# Patient Record
Sex: FEMALE | Race: WHITE | NOT HISPANIC OR LATINO
[De-identification: names, ages, dates, MRNs, and addresses within clinical notes are randomized per-mention and may not be internally consistent; named-entity substitution may affect disease eponyms.]

---

## 2017-01-17 ENCOUNTER — RESULT REVIEW (OUTPATIENT)
Age: 37
End: 2017-01-17

## 2018-02-09 PROBLEM — Z00.00 ENCOUNTER FOR PREVENTIVE HEALTH EXAMINATION: Status: ACTIVE | Noted: 2018-02-09

## 2018-03-10 ENCOUNTER — OUTPATIENT (OUTPATIENT)
Dept: OUTPATIENT SERVICES | Facility: HOSPITAL | Age: 38
LOS: 1 days | End: 2018-03-10
Payer: COMMERCIAL

## 2018-03-10 ENCOUNTER — APPOINTMENT (OUTPATIENT)
Dept: MRI IMAGING | Facility: HOSPITAL | Age: 38
End: 2018-03-10
Payer: COMMERCIAL

## 2018-03-10 DIAGNOSIS — R94.7 ABNORMAL RESULTS OF OTHER ENDOCRINE FUNCTION STUDIES: ICD-10-CM

## 2018-03-10 PROCEDURE — 70553 MRI BRAIN STEM W/O & W/DYE: CPT

## 2018-03-11 PROCEDURE — 70553 MRI BRAIN STEM W/O & W/DYE: CPT | Mod: 26

## 2018-04-23 ENCOUNTER — RESULT REVIEW (OUTPATIENT)
Age: 38
End: 2018-04-23

## 2019-08-16 ENCOUNTER — NON-APPOINTMENT (OUTPATIENT)
Age: 39
End: 2019-08-16

## 2019-08-16 ENCOUNTER — APPOINTMENT (OUTPATIENT)
Dept: CARDIOLOGY | Facility: CLINIC | Age: 39
End: 2019-08-16
Payer: COMMERCIAL

## 2019-08-16 VITALS
OXYGEN SATURATION: 100 % | BODY MASS INDEX: 20.02 KG/M2 | RESPIRATION RATE: 15 BRPM | DIASTOLIC BLOOD PRESSURE: 70 MMHG | SYSTOLIC BLOOD PRESSURE: 107 MMHG | HEART RATE: 86 BPM | HEIGHT: 63 IN | WEIGHT: 113 LBS

## 2019-08-16 DIAGNOSIS — Z87.891 PERSONAL HISTORY OF NICOTINE DEPENDENCE: ICD-10-CM

## 2019-08-16 DIAGNOSIS — E28.8 OTHER OVARIAN DYSFUNCTION: ICD-10-CM

## 2019-08-16 DIAGNOSIS — Z78.9 OTHER SPECIFIED HEALTH STATUS: ICD-10-CM

## 2019-08-16 DIAGNOSIS — E78.00 PURE HYPERCHOLESTEROLEMIA, UNSPECIFIED: ICD-10-CM

## 2019-08-16 DIAGNOSIS — Z82.49 FAMILY HISTORY OF ISCHEMIC HEART DISEASE AND OTHER DISEASES OF THE CIRCULATORY SYSTEM: ICD-10-CM

## 2019-08-16 DIAGNOSIS — R00.2 PALPITATIONS: ICD-10-CM

## 2019-08-16 DIAGNOSIS — Z72.89 OTHER PROBLEMS RELATED TO LIFESTYLE: ICD-10-CM

## 2019-08-16 PROCEDURE — 36415 COLL VENOUS BLD VENIPUNCTURE: CPT

## 2019-08-16 PROCEDURE — 93000 ELECTROCARDIOGRAM COMPLETE: CPT

## 2019-08-16 PROCEDURE — 99204 OFFICE O/P NEW MOD 45 MIN: CPT

## 2019-08-16 RX ORDER — ESTRADIOL 0.04 MG/D
0.04 PATCH, EXTENDED RELEASE TRANSDERMAL
Qty: 8 | Refills: 0 | Status: ACTIVE | COMMUNITY
Start: 2019-08-16

## 2019-08-16 RX ORDER — PROGESTERONE 100 MG/1
100 CAPSULE ORAL
Qty: 30 | Refills: 0 | Status: ACTIVE | COMMUNITY
Start: 2019-08-16

## 2019-08-16 NOTE — DISCUSSION/SUMMARY
[FreeTextEntry1] : In summary  is a 39-year-old female with a history of some atypical palpitations and several risk factors for CAD.  Her exam shows normal blood pressure, clear lungs, and a normal cardiac exam.  An EKG shows incomplete right bundle branch block and is within normal limits.\par \par She does not know her cholesterol values so complete blood work was drawn.  If her level is elevated she should be started on a statin.  She has been able to stop smoking with the use of e-cigarettes and I encouraged her to continue.  I also discussed using a coronary artery calcium score for further risk stratification if her lipids come back borderline.

## 2019-08-16 NOTE — HISTORY OF PRESENT ILLNESS
[FreeTextEntry1] : 39-year-old female being seen as a new patients because of her concerns about possible CAD.  She is in good general health and has no history of heart disease and no exertional complaints.  She does notice a sensation of "flutters" which move around her chest and which occur at rest in a random pattern.\par \par She has several risk factors for CAD.  She has a family history of CAD on both sides of her family, her cholesterol is elevated although she does not know the results, she has a history of cigarette smoking and discontinued in February and is currently using e-cigarettes, and she has a history of premature ovarian insufficiency.\par \par

## 2019-08-16 NOTE — PHYSICAL EXAM
[General Appearance - Well Developed] : well developed [Normal Appearance] : normal appearance [Well Groomed] : well groomed [General Appearance - Well Nourished] : well nourished [No Deformities] : no deformities [General Appearance - In No Acute Distress] : no acute distress [Normal Conjunctiva] : the conjunctiva exhibited no abnormalities [Eyelids - No Xanthelasma] : the eyelids demonstrated no xanthelasmas [Normal Oral Mucosa] : normal oral mucosa [No Oral Pallor] : no oral pallor [No Oral Cyanosis] : no oral cyanosis [Normal Jugular Venous A Waves Present] : normal jugular venous A waves present [Normal Jugular Venous V Waves Present] : normal jugular venous V waves present [No Jugular Venous Winslow A Waves] : no jugular venous winslow A waves [Heart Rate And Rhythm] : heart rate and rhythm were normal [Heart Sounds] : normal S1 and S2 [Murmurs] : no murmurs present [Respiration, Rhythm And Depth] : normal respiratory rhythm and effort [Exaggerated Use Of Accessory Muscles For Inspiration] : no accessory muscle use [Auscultation Breath Sounds / Voice Sounds] : lungs were clear to auscultation bilaterally [Abdomen Soft] : soft [Abdomen Tenderness] : non-tender [Abdomen Mass (___ Cm)] : no abdominal mass palpated [Abnormal Walk] : normal gait [Gait - Sufficient For Exercise Testing] : the gait was sufficient for exercise testing [Nail Clubbing] : no clubbing of the fingernails [Cyanosis, Localized] : no localized cyanosis [Petechial Hemorrhages (___cm)] : no petechial hemorrhages [Skin Color & Pigmentation] : normal skin color and pigmentation [] : no rash [No Venous Stasis] : no venous stasis [Skin Lesions] : no skin lesions [No Skin Ulcers] : no skin ulcer [No Xanthoma] : no  xanthoma was observed [Oriented To Time, Place, And Person] : oriented to person, place, and time [Affect] : the affect was normal [Mood] : the mood was normal [No Anxiety] : not feeling anxious

## 2019-08-19 LAB
ALBUMIN SERPL ELPH-MCNC: 4.7 G/DL
ALP BLD-CCNC: 57 U/L
ALT SERPL-CCNC: 14 U/L
ANION GAP SERPL CALC-SCNC: 14 MMOL/L
AST SERPL-CCNC: 13 U/L
BASOPHILS # BLD AUTO: 0.05 K/UL
BASOPHILS NFR BLD AUTO: 0.9 %
BILIRUB SERPL-MCNC: 0.2 MG/DL
BUN SERPL-MCNC: 14 MG/DL
CALCIUM SERPL-MCNC: 9.6 MG/DL
CHLORIDE SERPL-SCNC: 101 MMOL/L
CHOLEST SERPL-MCNC: 183 MG/DL
CHOLEST/HDLC SERPL: 2.6 RATIO
CO2 SERPL-SCNC: 27 MMOL/L
CREAT SERPL-MCNC: 0.7 MG/DL
EOSINOPHIL # BLD AUTO: 0.12 K/UL
EOSINOPHIL NFR BLD AUTO: 2.1 %
ESTIMATED AVERAGE GLUCOSE: 105 MG/DL
GLUCOSE SERPL-MCNC: 71 MG/DL
HBA1C MFR BLD HPLC: 5.3 %
HCT VFR BLD CALC: 43 %
HDLC SERPL-MCNC: 71 MG/DL
HGB BLD-MCNC: 13.5 G/DL
IMM GRANULOCYTES NFR BLD AUTO: 0.2 %
LDLC SERPL CALC-MCNC: 86 MG/DL
LYMPHOCYTES # BLD AUTO: 1.81 K/UL
LYMPHOCYTES NFR BLD AUTO: 31.6 %
MAN DIFF?: NORMAL
MCHC RBC-ENTMCNC: 29.2 PG
MCHC RBC-ENTMCNC: 31.4 GM/DL
MCV RBC AUTO: 92.9 FL
MONOCYTES # BLD AUTO: 0.43 K/UL
MONOCYTES NFR BLD AUTO: 7.5 %
NEUTROPHILS # BLD AUTO: 3.3 K/UL
NEUTROPHILS NFR BLD AUTO: 57.7 %
PLATELET # BLD AUTO: 260 K/UL
POTASSIUM SERPL-SCNC: 4.3 MMOL/L
PROT SERPL-MCNC: 7.4 G/DL
RBC # BLD: 4.63 M/UL
RBC # FLD: 12.6 %
SODIUM SERPL-SCNC: 142 MMOL/L
TRIGL SERPL-MCNC: 128 MG/DL
TSH SERPL-ACNC: 2.05 UIU/ML
WBC # FLD AUTO: 5.72 K/UL

## 2020-06-24 ENCOUNTER — RESULT REVIEW (OUTPATIENT)
Age: 40
End: 2020-06-24

## 2021-09-26 ENCOUNTER — INPATIENT (INPATIENT)
Facility: HOSPITAL | Age: 41
LOS: 0 days | Discharge: LEFT BEFORE DISCHARGE | End: 2021-09-27
Attending: HOSPITALIST | Admitting: HOSPITALIST
Payer: COMMERCIAL

## 2021-09-26 VITALS
DIASTOLIC BLOOD PRESSURE: 70 MMHG | SYSTOLIC BLOOD PRESSURE: 100 MMHG | RESPIRATION RATE: 16 BRPM | TEMPERATURE: 98 F | HEART RATE: 78 BPM | OXYGEN SATURATION: 100 %

## 2021-09-26 VITALS
TEMPERATURE: 98 F | RESPIRATION RATE: 16 BRPM | DIASTOLIC BLOOD PRESSURE: 73 MMHG | OXYGEN SATURATION: 100 % | HEART RATE: 100 BPM | SYSTOLIC BLOOD PRESSURE: 112 MMHG

## 2021-09-26 DIAGNOSIS — L03.213 PERIORBITAL CELLULITIS: ICD-10-CM

## 2021-09-26 LAB
ALBUMIN SERPL ELPH-MCNC: 4.7 G/DL — SIGNIFICANT CHANGE UP (ref 3.3–5)
ALP SERPL-CCNC: 57 U/L — SIGNIFICANT CHANGE UP (ref 40–120)
ALT FLD-CCNC: 14 U/L — SIGNIFICANT CHANGE UP (ref 4–33)
ANION GAP SERPL CALC-SCNC: 14 MMOL/L — SIGNIFICANT CHANGE UP (ref 7–14)
AST SERPL-CCNC: 16 U/L — SIGNIFICANT CHANGE UP (ref 4–32)
BASOPHILS # BLD AUTO: 0.05 K/UL — SIGNIFICANT CHANGE UP (ref 0–0.2)
BASOPHILS NFR BLD AUTO: 0.6 % — SIGNIFICANT CHANGE UP (ref 0–2)
BILIRUB SERPL-MCNC: 0.4 MG/DL — SIGNIFICANT CHANGE UP (ref 0.2–1.2)
BUN SERPL-MCNC: 12 MG/DL — SIGNIFICANT CHANGE UP (ref 7–23)
CALCIUM SERPL-MCNC: 9.3 MG/DL — SIGNIFICANT CHANGE UP (ref 8.4–10.5)
CHLORIDE SERPL-SCNC: 102 MMOL/L — SIGNIFICANT CHANGE UP (ref 98–107)
CO2 SERPL-SCNC: 23 MMOL/L — SIGNIFICANT CHANGE UP (ref 22–31)
CREAT SERPL-MCNC: 0.66 MG/DL — SIGNIFICANT CHANGE UP (ref 0.5–1.3)
EOSINOPHIL # BLD AUTO: 0.1 K/UL — SIGNIFICANT CHANGE UP (ref 0–0.5)
EOSINOPHIL NFR BLD AUTO: 1.1 % — SIGNIFICANT CHANGE UP (ref 0–6)
GLUCOSE SERPL-MCNC: 97 MG/DL — SIGNIFICANT CHANGE UP (ref 70–99)
HCG SERPL-ACNC: <5 MIU/ML — SIGNIFICANT CHANGE UP
HCT VFR BLD CALC: 40.3 % — SIGNIFICANT CHANGE UP (ref 34.5–45)
HGB BLD-MCNC: 13.6 G/DL — SIGNIFICANT CHANGE UP (ref 11.5–15.5)
IANC: 6.7 K/UL — SIGNIFICANT CHANGE UP (ref 1.5–8.5)
IMM GRANULOCYTES NFR BLD AUTO: 0.1 % — SIGNIFICANT CHANGE UP (ref 0–1.5)
LYMPHOCYTES # BLD AUTO: 1.54 K/UL — SIGNIFICANT CHANGE UP (ref 1–3.3)
LYMPHOCYTES # BLD AUTO: 17.3 % — SIGNIFICANT CHANGE UP (ref 13–44)
MCHC RBC-ENTMCNC: 29.8 PG — SIGNIFICANT CHANGE UP (ref 27–34)
MCHC RBC-ENTMCNC: 33.7 GM/DL — SIGNIFICANT CHANGE UP (ref 32–36)
MCV RBC AUTO: 88.2 FL — SIGNIFICANT CHANGE UP (ref 80–100)
MONOCYTES # BLD AUTO: 0.48 K/UL — SIGNIFICANT CHANGE UP (ref 0–0.9)
MONOCYTES NFR BLD AUTO: 5.4 % — SIGNIFICANT CHANGE UP (ref 2–14)
NEUTROPHILS # BLD AUTO: 6.7 K/UL — SIGNIFICANT CHANGE UP (ref 1.8–7.4)
NEUTROPHILS NFR BLD AUTO: 75.5 % — SIGNIFICANT CHANGE UP (ref 43–77)
NRBC # BLD: 0 /100 WBCS — SIGNIFICANT CHANGE UP
NRBC # FLD: 0 K/UL — SIGNIFICANT CHANGE UP
PLATELET # BLD AUTO: 267 K/UL — SIGNIFICANT CHANGE UP (ref 150–400)
POTASSIUM SERPL-MCNC: 3.8 MMOL/L — SIGNIFICANT CHANGE UP (ref 3.5–5.3)
POTASSIUM SERPL-SCNC: 3.8 MMOL/L — SIGNIFICANT CHANGE UP (ref 3.5–5.3)
PROT SERPL-MCNC: 7.4 G/DL — SIGNIFICANT CHANGE UP (ref 6–8.3)
RBC # BLD: 4.57 M/UL — SIGNIFICANT CHANGE UP (ref 3.8–5.2)
RBC # FLD: 11.8 % — SIGNIFICANT CHANGE UP (ref 10.3–14.5)
SODIUM SERPL-SCNC: 139 MMOL/L — SIGNIFICANT CHANGE UP (ref 135–145)
WBC # BLD: 8.88 K/UL — SIGNIFICANT CHANGE UP (ref 3.8–10.5)
WBC # FLD AUTO: 8.88 K/UL — SIGNIFICANT CHANGE UP (ref 3.8–10.5)

## 2021-09-26 PROCEDURE — 99285 EMERGENCY DEPT VISIT HI MDM: CPT

## 2021-09-26 PROCEDURE — 70481 CT ORBIT/EAR/FOSSA W/DYE: CPT | Mod: 26

## 2021-09-26 RX ORDER — OXYCODONE AND ACETAMINOPHEN 5; 325 MG/1; MG/1
1 TABLET ORAL ONCE
Refills: 0 | Status: DISCONTINUED | OUTPATIENT
Start: 2021-09-26 | End: 2021-09-26

## 2021-09-26 RX ORDER — ACETAMINOPHEN 500 MG
650 TABLET ORAL EVERY 6 HOURS
Refills: 0 | Status: DISCONTINUED | OUTPATIENT
Start: 2021-09-26 | End: 2021-09-27

## 2021-09-26 RX ORDER — SODIUM CHLORIDE 9 MG/ML
1000 INJECTION INTRAMUSCULAR; INTRAVENOUS; SUBCUTANEOUS ONCE
Refills: 0 | Status: COMPLETED | OUTPATIENT
Start: 2021-09-26 | End: 2021-09-26

## 2021-09-26 RX ORDER — DIPHENHYDRAMINE HCL 50 MG
50 CAPSULE ORAL ONCE
Refills: 0 | Status: COMPLETED | OUTPATIENT
Start: 2021-09-26 | End: 2021-09-26

## 2021-09-26 RX ORDER — ACETAMINOPHEN 500 MG
975 TABLET ORAL ONCE
Refills: 0 | Status: COMPLETED | OUTPATIENT
Start: 2021-09-26 | End: 2021-09-26

## 2021-09-26 RX ORDER — ENOXAPARIN SODIUM 100 MG/ML
40 INJECTION SUBCUTANEOUS DAILY
Refills: 0 | Status: DISCONTINUED | OUTPATIENT
Start: 2021-09-26 | End: 2021-09-27

## 2021-09-26 RX ORDER — PIPERACILLIN AND TAZOBACTAM 4; .5 G/20ML; G/20ML
3.38 INJECTION, POWDER, LYOPHILIZED, FOR SOLUTION INTRAVENOUS ONCE
Refills: 0 | Status: COMPLETED | OUTPATIENT
Start: 2021-09-26 | End: 2021-09-26

## 2021-09-26 RX ORDER — VANCOMYCIN HCL 1 G
1000 VIAL (EA) INTRAVENOUS ONCE
Refills: 0 | Status: COMPLETED | OUTPATIENT
Start: 2021-09-26 | End: 2021-09-26

## 2021-09-26 RX ADMIN — Medication 50 MILLIGRAM(S): at 21:08

## 2021-09-26 RX ADMIN — OXYCODONE AND ACETAMINOPHEN 1 TABLET(S): 5; 325 TABLET ORAL at 17:24

## 2021-09-26 RX ADMIN — Medication 975 MILLIGRAM(S): at 09:55

## 2021-09-26 RX ADMIN — PIPERACILLIN AND TAZOBACTAM 200 GRAM(S): 4; .5 INJECTION, POWDER, LYOPHILIZED, FOR SOLUTION INTRAVENOUS at 16:46

## 2021-09-26 RX ADMIN — Medication 250 MILLIGRAM(S): at 17:25

## 2021-09-26 RX ADMIN — Medication 100 MILLIGRAM(S): at 09:55

## 2021-09-26 RX ADMIN — Medication 50 MILLIGRAM(S): at 13:53

## 2021-09-26 RX ADMIN — SODIUM CHLORIDE 1000 MILLILITER(S): 9 INJECTION INTRAMUSCULAR; INTRAVENOUS; SUBCUTANEOUS at 09:55

## 2021-09-26 NOTE — ED PROVIDER NOTE - PROGRESS NOTE DETAILS
DD ED ATTG: L eye swelling decreased.  however pt developed hives to arms.  clinda and CT with contrast were done hours before.  Pt did eat recently.  No sign of throat swelling, lips/tongue swelling, voice change.  Allergic reaction.  Pt thinks it may be due to the gown.  Given new gown.  Pt recently ate lunch - possibly allergic reaction to food - will rx benadryl.  Pt has been seen by ophtho - concern for lacrimal gland cyst vs abscess.  Ophtho exam ongoing, likely admission for IV abx and consideration of OR.  KRANTHI Bailey advised pt about this plan.

## 2021-09-26 NOTE — ED ADULT TRIAGE NOTE - CHIEF COMPLAINT QUOTE
Pt c/o swelling and pain to Lt eye starting yesterday, pt having difficulty seeing out of Lt eye. Pt got 1st dose of covid vaccine on 9/24/2021. Currently afebrile, no known allergies.

## 2021-09-26 NOTE — ED ADULT NURSE NOTE - OBJECTIVE STATEMENT
Pt rec'd in intake, c/o pain and swelling to left eye since yesterday, denies discharge, denies fever. Pt denies visual changes, denies sick contacts.

## 2021-09-26 NOTE — ED PROVIDER NOTE - CLINICAL SUMMARY MEDICAL DECISION MAKING FREE TEXT BOX
41F, no sig PMH/POH, presents with OS periorbital swelling, erythema and pain for a day. Yesterday pt noticed swelling and pain in her JOAN on the lateral aspect. The pain increased gradually and was associated with significant tearing and photophobia. She denies any fever or systemic symptoms including URTI symptoms. Denies any visual changes, flashes, floaters, curtains or discharge from the eye.  plan  - labs  - ct orbits  - ophthalmology consult  - iv abx  - reassess

## 2021-09-26 NOTE — CHART NOTE - NSCHARTNOTEFT_GEN_A_CORE
pt with red rash over body after receiving abx.  she said happened 1st time as well, received benedryl then.  giving another dose and informed Dr. Abraham of inpatient team that pt needs exam, possible allergy vs heide.  pt breathing well

## 2021-09-26 NOTE — ED PROVIDER NOTE - ATTENDING CONTRIBUTION TO CARE
41F p/w L eye swelling, redness and pain x 1 day, gradually increasing.  No fever.  Some pain with moving eye around, (+)photosensitivity.  No injury.  Never happened before.  L eye swollen shut but able to see when eye opened manually.  Pain and restriction of movement of L eye upon lateral gaze either way.  No purulence.  Plan CT orbits, ophtho eval, rx'ed pain meds and Iv abx.  VS:  unremarkable except tachycardia     GEN - mild distress L eye pain, photophobia, malaise;   A+O x3   HEAD - NC/AT     ENT - PEERL, mucous membranes    moist , no discharge    L eye swollen shut but able to see when eye opened manually.  Pain and restriction of movement of L eye upon lateral gaze either way.  No purulence. Corneas clear.     NECK: Neck supple, non-tender without lymphadenopathy, no masses, no JVD  PULM - CTA b/l,  symmetric breath sounds  COR -  normal heart sounds    ABD - , ND, NT, soft,  BACK - no CVA tenderness, nontender spine     EXTREMS - no edema, no deformity, warm and well perfused    SKIN - no rash    or bruising      NEUROLOGIC - alert, face symmetric, speech fluent, sensation nl, motor no focal deficit.

## 2021-09-26 NOTE — CONSULT NOTE ADULT - ASSESSMENT
PRELIMINARY NOTE  41F, no PMH, POH of lacrimal cyst excision OD, presented with slowly growing JOAN swelling that became red and painful for 1 day. On exam, VA 20/20 OU, PERRL no APD, IOP wnl OU. EOM with pain in far right and left gaze but no limitation or diplopia, confrontational VF full and color vision intact OU. OS with JOAN swelling erythema and tenderness, no mass noted. No LAD. JOAN flipped without chalazion. OS conjunctiva with mild injection, corneas clear OU, AC deep and quiet OU. Fundus exam unremarkable OU. CT orbits showing multiloculated left orbital abscess involving the preseptal and postseptal space.    # Lisa lacrimal gland abscess OS  -Pt with history of lacrimal gland cyst, s/p excision in [place] 20 years ago.   -Presented with slowly growing mass with acute inflammatory change in the past day.  -JOAN swelling, erythema and tenderness, no chalazion or drainage, no visible of lid flip.  -CT orbits positive for multiloculated abscess involving orbital space  -S/p IV clindamycin 600 mg  -Keep NPO  -Per literature review most common pathogens are Staph sp. Strep pneumo, Kleb pneumonia also reported.     PRELIMINARY NOTE  41F, no PMH, POH of lacrimal cyst excision OD, presented with slowly growing JOAN swelling that became red and painful for 1 day. On exam, VA 20/20 OU, PERRL no APD, IOP wnl OU. EOM with pain in far right and left gaze but no limitation or diplopia, confrontational VF full and color vision intact OU. OS with JOAN swelling erythema and tenderness, no mass noted. No LAD. JOAN flipped without chalazion. OS conjunctiva with mild injection, corneas clear OU, AC deep and quiet OU. Fundus exam unremarkable OU. CT orbits showing multiloculated left orbital abscess involving the preseptal and postseptal space.    # Lisa lacrimal gland abscess OS  -Pt with history of lacrimal gland cyst, s/p excision in OD 20 years ago.   -Presented with slowly growing mass with acute inflammatory change in the past day.  -JAON swelling, erythema and tenderness, no chalazion or drainage, no visible of lid flip.  - EOM ess full, no proptosis, no RTR  -CT orbits positive for multiloculated abscess involving orbital space  -S/p IV clindamycin 600 mg  -Keep NPO for possible drainage tomorrow  - please document medical clearance for possible drainage  - COVID test please  -s/p reaction?to vanco. Start clinda and ceftriaxone for coverage.   - Warm compresses 4x a day  - Dr Salgado will evaluate in the AM and decide on surgery   -Per literature review most common pathogens are Staph sp. Strep pneumo, Kleb pneumonia also reported.       SDW Dr Arce & D/w Dr Salgado with videos and photos   41F, no PMH, POH of lacrimal cyst excision OD, presented with slowly growing JOAN swelling that became red and painful for 1 day. On exam, VA 20/20 OU, PERRL no APD, IOP wnl OU. EOM with pain in far right and left gaze but no limitation or diplopia, confrontational VF full and color vision intact OU. OS with JOAN swelling erythema and tenderness, no mass noted. No LAD. JOAN flipped without chalazion. OS conjunctiva with mild injection, corneas clear OU, AC deep and quiet OU. Fundus exam unremarkable OU. CT orbits showing multiloculated left orbital abscess involving the preseptal and postseptal space.    # Lisa lacrimal gland abscess OS  -Pt with history of lacrimal gland cyst, s/p excision in OD 20 years ago.   -Presented with slowly growing mass with acute inflammatory change in the past day.  -JOAN swelling, erythema and tenderness, no chalazion or drainage, no visible of lid flip.  - EOM ess full, no proptosis, no RTR  -CT orbits positive for multiloculated abscess involving orbital space  -S/p IV clindamycin 600 mg, IV vanco and zosyn  - Start IV clindamycin and IV ceftriaxone starting tomorrow  - Keep NPO from midnight for possible drainage tomorrow  - please document medical clearance for possible drainage  - COVID test please  -s/p reaction?to vanco. Start clinda and ceftriaxone for coverage.   - Warm compresses 4x a day  - Dr Salgado will evaluate in the AM and decide on surgery   -Per literature review most common pathogens are Staph sp. Strep pneumo, Kleb pneumonia also reported.     SDW Dr Arce & D/w Dr Salgado with videos and photos

## 2021-09-26 NOTE — ED PROVIDER NOTE - OBJECTIVE STATEMENT
41F, no sig PMH/POH, presents with OS periorbital swelling, erythema and pain for a day. Yesterday pt noticed swelling and pain in her JOAN on the lateral aspect. The pain increased gradually and was associated with significant tearing and photophobia. She denies any fever or systemic symptoms including URTI symptoms. Denies any visual changes, flashes, floaters, curtains or discharge from the eye.

## 2021-09-26 NOTE — ED ADULT NURSE NOTE - NS ED NURSE ELOPE COMMENTS
[id number]. mar at bedside explaining plan of care of optho consult. patient in street clothes. reports she did not want to stay because shes been here for hours. ripped out iv. provider gave band aid. patient ambulated with steady gait out of er.

## 2021-09-26 NOTE — CONSULT NOTE ADULT - SUBJECTIVE AND OBJECTIVE BOX
[facility] DEPARTMENT OF OPHTHALMOLOGY - INITIAL ADULT CONSULT  ----------------------------------------------------------------------------------------------------  Jorden Ayo, PGY-2  [phone], available on teams  ----------------------------------------------------------------------------------------------------    HPI:  41F, no sig PMH/POH, presents with OS periorbital swelling, erythema and pain for a day. Yesterday pt noticed swelling and pain in her JOAN on the lateral aspect. The pain increased gradually and was associated with significant tearing and photophobia. She denies any fever or systemic symptoms including URTI symptoms. Denies any visual changes, flashes, floaters, curtains or discharge from the eye. On further ROS pt remembered that 20 years ago she had a cyst in the upper eye lid that was drained by an ophthalmologist as an elective procedure under local anesthesia.   Recently received the first dose of the Pfizer vaccine.     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    MEDICATIONS  (STANDING):  MEDICATIONS  (PRN):    Allergies & Intolerances: none to meds    Review of Systems:  Constitutional: No fever, chills  Eyes: No blurry vision, flashes, floaters, FBS, ++erythema OS, no discharge, double vision, OU  Neuro: No tremors  Cardiovascular: No chest pain, palpitations  Respiratory: No SOB, no cough  GI: No nausea, vomiting, abdominal pain  : No dysuria  Skin: no rash  Psych: no depression  Endocrine: no polyuria, polydipsia  Heme/lymph: no swelling    VITALS: T(C): 36.7 (09-26-21 @ 08:36)  T(F): 98 (09-26-21 @ 08:36), Max: 98 (09-26-21 @ 08:36)  HR: 100 (09-26-21 @ 08:36) (100 - 100)  BP: 112/73 (09-26-21 @ 08:36) (112/73 - 112/73)  RR:  (16 - 16)  SpO2:  (100% - 100%)  General: AAO x 3, appropriate mood and affect    Ophthalmology Exam:  Visual acuity (sc): 20/20 OU  Pupils: PERRL OU, no APD  Ttono: 13/14  Extraocular movements (EOMs): Full OU, no pain, no diplopia  Confrontational Visual Field (CVF): Full OU  Color Plates: 12/12 OU    Pen Light Exam (PLE)  External: Flat OD, OS with JOAN swelling erythema and tenderness, no mass noted. No LAD.   Lids/Lashes/Lacrimal Ducts: Flat OD, OS as above, lid flipped without chalazion  Sclera/Conjunctiva: W+Q OD, OS +1 injection  Cornea: Cl OU  Anterior Chamber: D+F OU    Iris: Flat OU  Lens: Cl OU    Fundus Exam: dilated with 1% tropicamide and 2.5% phenylephrine  Approval obtained from primary team for dilation  Patient aware that pupils can remained dilated for at least 4-6 hours  Exam performed with 20D lens    Vitreous: wnl OU  Disc, cup/disc: sharp and pink, 0.4 OU  Macula: wnl OU  Vessels: wnl OU  Periphery: wnl OU    Labs/Imaging:       [facility] DEPARTMENT OF OPHTHALMOLOGY - INITIAL ADULT CONSULT  ----------------------------------------------------------------------------------------------------  Jorden Coffeybayandel, PGY-2  [phone], available on teams  ----------------------------------------------------------------------------------------------------    HPI:  41F, no sig PMH/POH, presents with OS periorbital swelling, erythema and pain for a day. Yesterday pt noticed swelling and pain in her JOAN on the lateral aspect. The pain increased gradually and was associated with significant tearing and photophobia. She denies any fever or systemic symptoms including URTI symptoms. Denies any visual changes, flashes, floaters, curtains or discharge from the eye. On further ROS pt remembered that 20 years ago she had a cyst in the upper eye lid that was drained by an ophthalmologist as an elective procedure under local anesthesia.   On further questioning, pt admits to having a small swelling in that area in the past month that was painless and not red, she tried to reach the ophthalmologist that operated on her but couldn't.   Recently received the first dose of the Pfizer vaccine.     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    MEDICATIONS  (STANDING):  MEDICATIONS  (PRN):    Allergies & Intolerances: none to meds    Review of Systems:  Constitutional: No fever, chills  Eyes: No blurry vision, flashes, floaters, FBS, ++erythema OS, no discharge, double vision, OU  Neuro: No tremors  Cardiovascular: No chest pain, palpitations  Respiratory: No SOB, no cough  GI: No nausea, vomiting, abdominal pain  : No dysuria  Skin: no rash  Psych: no depression  Endocrine: no polyuria, polydipsia  Heme/lymph: no swelling    VITALS: T(C): 36.7 (09-26-21 @ 08:36)  T(F): 98 (09-26-21 @ 08:36), Max: 98 (09-26-21 @ 08:36)  HR: 100 (09-26-21 @ 08:36) (100 - 100)  BP: 112/73 (09-26-21 @ 08:36) (112/73 - 112/73)  RR:  (16 - 16)  SpO2:  (100% - 100%)  General: AAO x 3, appropriate mood and affect    Ophthalmology Exam:  Visual acuity (sc): 20/20 OU  Pupils: PERRL OU, no APD  Ttono: 13/14  Extraocular movements (EOMs): Full OU, pain on left and right far gaze, no diplopia  Confrontational Visual Field (CVF): Full OU  Color Plates: 12/12 OU    Pen Light Exam (PLE)  External: Flat OD, OS with JOAN swelling erythema and tenderness, no mass noted. No LAD.   Lids/Lashes/Lacrimal Ducts: Flat OD, OS as above, lid flipped without chalazion  Sclera/Conjunctiva: W+Q OD, OS +1 injection  Cornea: Cl OU  Anterior Chamber: D+F OU    Iris: Flat OU  Lens: Cl OU    Fundus Exam: dilated with 1% tropicamide and 2.5% phenylephrine  Approval obtained from primary team for dilation  Patient aware that pupils can remained dilated for at least 4-6 hours  Exam performed with 20D lens    Vitreous: wnl OU  Disc, cup/disc: sharp and pink, 0.4 OU  Macula: wnl OU  Vessels: wnl OU  Periphery: wnl OU    Labs/Imaging:       [facility] DEPARTMENT OF OPHTHALMOLOGY - INITIAL ADULT CONSULT  ----------------------------------------------------------------------------------------------------  Jorden Coffeybayandel, PGY-2  [phone], available on teams  ----------------------------------------------------------------------------------------------------    HPI:  41F, no sig PMH/POH, presents with OS periorbital swelling, erythema and pain for a day. Yesterday pt noticed swelling and pain in her JOAN on the lateral aspect. The pain increased gradually and was associated with significant tearing and photophobia. She denies any fever or systemic symptoms including URTI symptoms. Denies any visual changes, flashes, floaters, curtains or discharge from the eye. On further ROS pt remembered that 20 years ago she had a cyst in the upper eye lid that was drained by an ophthalmologist as an elective procedure under local anesthesia.   On further questioning, pt admits to having a small swelling in that area in the past month that was painless and not red, she tried to reach the ophthalmologist that operated on her but couldn't.   Recently received the first dose of the Pfizer vaccine.     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    MEDICATIONS  (STANDING):  MEDICATIONS  (PRN):    Allergies & Intolerances: none to meds    Review of Systems:  Constitutional: No fever, chills  Eyes: No blurry vision, flashes, floaters, FBS, ++erythema OS, no discharge, double vision, OU  Neuro: No tremors  Cardiovascular: No chest pain, palpitations  Respiratory: No SOB, no cough  GI: No nausea, vomiting, abdominal pain  : No dysuria  Skin: no rash  Psych: no depression  Endocrine: no polyuria, polydipsia  Heme/lymph: no swelling    VITALS: T(C): 36.7 (09-26-21 @ 08:36)  T(F): 98 (09-26-21 @ 08:36), Max: 98 (09-26-21 @ 08:36)  HR: 100 (09-26-21 @ 08:36) (100 - 100)  BP: 112/73 (09-26-21 @ 08:36) (112/73 - 112/73)  RR:  (16 - 16)  SpO2:  (100% - 100%)  General: AAO x 3, appropriate mood and affect    Ophthalmology Exam:  Visual acuity (sc): 20/20 OU  Pupils: PERRL OU, no APD  Ttono: 13/14  Extraocular movements (EOMs): Full OU, pain on left and right far gaze, no diplopia  Confrontational Visual Field (CVF): Full OU  Color Plates: 12/12 OU    Pen Light Exam (PLE)  External: Flat OD, OS with JOAN swelling erythema and tenderness, no mass noted. No LAD.   Lids/Lashes/Lacrimal Ducts: Flat OD, OS as above, lid flipped without chalazion  Sclera/Conjunctiva: W+Q OD, OS +1 injection  Cornea: Cl OU  Anterior Chamber: D+F OU    Iris: Flat OU  Lens: Cl OU    Fundus Exam: dilated with 1% tropicamide and 2.5% phenylephrine  Approval obtained from primary team for dilation  Patient aware that pupils can remained dilated for at least 4-6 hours  Exam performed with 20D lens    Vitreous: wnl OU  Disc, cup/disc: sharp and pink, 0.3 OU  Macula: wnl OU  Vessels: wnl OU  Periphery: wnl OU    Labs/Imaging:       [facility] DEPARTMENT OF OPHTHALMOLOGY - INITIAL ADULT CONSULT  ----------------------------------------------------------------------------------------------------  Jorden Coffeybayandel, PGY-2  [phone], available on teams  ----------------------------------------------------------------------------------------------------    HPI:  41F, no sig PMH/POH, presents with OS periorbital swelling, erythema and pain for a day. Yesterday pt noticed swelling and pain in her JONA on the lateral aspect. The pain increased gradually and was associated with significant tearing and photophobia. She denies any fever or systemic symptoms including URTI symptoms. Denies any visual changes, flashes, floaters, curtains or discharge from the eye. On further ROS pt remembered that 20 years ago she had a cyst in the upper eye lid that was drained by an ophthalmologist as an elective procedure under local anesthesia.   On further questioning, pt admits to having a small swelling in that area in the past month that was painless and not red, she tried to reach the ophthalmologist that operated on her but couldn't.   Recently received the first dose of the Pfizer vaccine.     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    MEDICATIONS  (STANDING):  MEDICATIONS  (PRN):    Allergies & Intolerances: none to meds    Review of Systems:  Constitutional: No fever, chills  Eyes: No blurry vision, flashes, floaters, FBS, ++erythema OS, no discharge, double vision, OU  Neuro: No tremors  Cardiovascular: No chest pain, palpitations  Respiratory: No SOB, no cough  GI: No nausea, vomiting, abdominal pain  : No dysuria  Skin: no rash  Psych: no depression  Endocrine: no polyuria, polydipsia  Heme/lymph: no swelling    VITALS: T(C): 36.7 (09-26-21 @ 08:36)  T(F): 98 (09-26-21 @ 08:36), Max: 98 (09-26-21 @ 08:36)  HR: 100 (09-26-21 @ 08:36) (100 - 100)  BP: 112/73 (09-26-21 @ 08:36) (112/73 - 112/73)  RR:  (16 - 16)  SpO2:  (100% - 100%)  General: AAO x 3, appropriate mood and affect    Ophthalmology Exam:  Visual acuity (sc): 20/20 OU  Pupils: PERRL OU, no APD  Ttono: 13/14  Extraocular movements (EOMs): Full OU, pain on left and right far gaze, no diplopia  Confrontational Visual Field (CVF): Full OU  Color Plates: 12/12 OU    Pen Light Exam (PLE)  External: Flat OD, OS with JOAN swelling erythema and tenderness, no mass noted. No LAD.   Lids/Lashes/Lacrimal Ducts: Flat OD, OS as above, lid flipped without chalazion  Sclera/Conjunctiva: W+Q OD, OS +1 injection  Cornea: Cl OU  Anterior Chamber: D+F OU    Iris: Flat OU  Lens: Cl OU    Fundus Exam: dilated with 1% tropicamide and 2.5% phenylephrine  Approval obtained from primary team for dilation  Patient aware that pupils can remained dilated for at least 4-6 hours  Exam performed with 20D lens    Vitreous: wnl OU  Disc, cup/disc: sharp and pink, 0.3 OU  Macula: wnl OU  Vessels: wnl OU  Periphery: wnl OU    Labs/Imaging:  PROCEDURE DATE:  Sep 26 2021     INTERPRETATION:  INDICATIONS:  L eye pain and swelling  TECHNIQUE: Multidetector reformatted CT images of the neck were obtained following the intravenous administration of 90 mL Omnipaque 350.  COMPARISON: MR brain 3/10/2018.    FINDINGS:  There is a multiloculated rim enhancing preseptal and postseptal fluid collection which abuts the anterolateral aspect of the left globe and involves the conjunctiva but also extends posteriorly into the left orbit, tracking along the extraconal space and abutting the left lateral rectus muscle, consistent with abscess.  There is associated mild exophthalmos of the left globe.  There is prominent inflammatory stranding and edema of the left periorbital and premaxillary soft tissues.  Skin and subcutaneous soft tissues: No asymmetrical soft tissue swelling or hematoma.  Osseous structures: No fracture, dislocation or destructive lesion.  Orbits: The globes, retrobulbar fat, extraocular muscles, and optic nerve sheath complexes are intact and normal in morphology.  Paranasal sinuses: There is mild mucosal thickening and polyp/mucus retention cysts in the maxillary sinuses.  Mastoid air cells: Clear.  Other: The partially visualized intracranial structures are normal.      IMPRESSION:  Multiloculated left orbital abscess involving the preseptal and postseptal space, as above.    --- End of Report ---

## 2021-09-27 LAB — SARS-COV-2 RNA SPEC QL NAA+PROBE: SIGNIFICANT CHANGE UP

## 2021-09-27 RX ORDER — PROGESTERONE 200 MG/1
1 CAPSULE, LIQUID FILLED ORAL
Qty: 0 | Refills: 0 | DISCHARGE

## 2021-09-27 RX ADMIN — Medication 650 MILLIGRAM(S): at 00:04

## 2021-09-27 NOTE — H&P ADULT - HISTORY OF PRESENT ILLNESS
41-year-old history with  41-year-old with history of R lacrimal gland excision, presenting for 1 day of erythema, edema, and pain of L eye.  41-year-old with history of R lacrimal gland excision, presenting for 1 day of erythema, edema, and pain of L eye.     Patient was first seen around 9pm on 9/26/21. Patient reported that her symptoms had existed for about 36h. At first, she had been unable to open the L eye because it was so swollen, but the swelling has decreased enough that she could keep it open during our conversation. The L eyelid and infraorbital area were both erythematous and edematous. The eye was painful to the touch and her vision was blurry in that eye. She also reported constant tearing of the eye. She denied itchiness of the eye. Denied prior trauma or injuries to the eye. She took 1x Advil at home for pain relief. She tried a cold compress to the eye, but this only caused her pain, so she stopped. She denied fevers, chills, or headaches. Notably, many years ago she had the lacrimal gland in the R eye removed; she was told the gland kept getting "blocked" and would cause her eye to swell up when she cried.    She also complained of a rash that occurred after receiving dye with the CT scan and again after receiving an antibiotic in the ED. The rash is itchy but not painful. It is erythematous and extends across all 4 extremities, her chest and abdomen. She denies having any allergies or past rashes like this one. She denied having SOB, wheezing, or throat tightness.    She is otherwise a healthy person. The only medications she takes are progesterone 100mg PO daily and estradiol 0.375mg patch changed twice weekly. She is unsure why she is taking this medications. She thinks the progesterone is "stop me from getting breast cancer."    In the ED, patient was afebrile and VSS. CBC and BMP unremarkable. CT of orbits showing Multiloculated left orbital abscess involving the preseptal and postseptal space. S/p 1x each vancomycin, clindamycin, and Zosyn. Patient received 2x Benadryl for rash and 2x Percocet for pain. Patient was seen by ophthalmology, who recommended continuation of Zosyn and clindamycin, discontinuation of vancomycin for possible Red Man syndrome, and urgent drainage of the abscess.  About 3 hours after first encounter, writer returned to assess patient because ED RN was concerned that periorbital edema was worsening. Patient appeared uncomfortable, tired, and frustrated. Erythema and edema of L eye had spread to encompass entire eyelid, keeping the eye shut and patient was unable to open it. There was new erythema and trace edema of R eyelid. The patient was unable to fully open the R eye or have full EOM. R pupil still reactive to light. Patient was upset and in pain, expressing willingness to try Tylenol. Writer expressed my concern that patient was looking sick and was unsafe to go home. Assured patient that I would contact ophthalmology and attending hospitalist. 41-year-old with history of R lacrimal gland excision, presenting for 1 day of erythema, edema, and pain of L eye.     Patient was first seen around 9pm on 9/26/21. Patient reported that her symptoms had existed for about 36h. At first, she had been unable to open the L eye because it was so swollen, but the swelling has decreased enough that she could keep it open during our conversation. The L eyelid and infraorbital area were both erythematous and edematous. The eye was painful to the touch and her vision was blurry in that eye. She also reported constant tearing of the eye. She denied itchiness of the eye. Denied prior trauma or injuries to the eye. She took 1x Advil at home for pain relief. She tried a cold compress to the eye, but this only caused her pain, so she stopped. She denied fevers, chills, or headaches. Notably, many years ago she had the lacrimal gland in the R eye removed; she was told the gland kept getting "blocked" and would cause her eye to swell up when she cried.    She also complained of a rash that occurred after receiving dye with the CT scan and again after receiving an antibiotic in the ED. The rash is itchy but not painful. It is erythematous and extends across all 4 extremities, her chest and abdomen. She denies having any allergies or past rashes like this one. She denied having SOB, wheezing, or throat tightness.    She is otherwise a healthy person. The only medications she takes are progesterone 100mg PO daily and estradiol 0.375mg patch changed twice weekly. She is unsure why she is taking this medications. She thinks the progesterone is "stop me from getting breast cancer."    In the ED, patient was afebrile and VSS. CBC and BMP unremarkable. CT of orbits showing Multiloculated left orbital abscess involving the preseptal and postseptal space. S/p 1x each vancomycin, clindamycin, and Zosyn. Patient received 2x Benadryl for rash and 2x Percocet for pain. Patient was seen by ophthalmology, who recommended continuation of Zosyn and clindamycin, discontinuation of vancomycin for possible Red Man syndrome, and urgent drainage of the abscess.  About 3 hours after first encounter, writer returned to assess patient because ED RN was concerned that periorbital edema was worsening. Patient appeared uncomfortable, tired, and frustrated. Erythema and edema of L eye had spread to encompass entire eyelid, keeping the eye shut and patient was unable to open it. There was new erythema and trace edema of R eyelid. The patient was unable to fully open the R eye or have full EOM. R pupil still reactive to light. Patient was upset and in pain, expressing willingness to try Tylenol. Writer expressed my concern that patient was looking sick and was unsafe to go home. Assured patient that I would contact ophthalmology and attending hospitalist ASAP.

## 2021-09-27 NOTE — H&P ADULT - NSHPLABSRESULTS_GEN_ALL_CORE
CT of orbits showing Multiloculated left orbital abscess involving the preseptal and postseptal space.

## 2021-09-27 NOTE — H&P ADULT - ASSESSMENT
41-year-old female with history of R lacrimal gland excision, presenting for 1 day of L eye edema, erythema, and pain. This edema increased over a few hours, causing the patient's eye to remain closed. There was also trace edema and erythema of the R eye. CT of orbits showing Multiloculated left orbital abscess involving the preseptal and postseptal space. Plan was to continue patient on antibiotics and have urgent drainage of abscess. Rash was likely allergic reaction to contrast dye versus Vancomycin; plan was to continue with Benadryl PRN and discontinue use of Vancomycin.    However, despite our attempts to educate the patient and express our concerns about her condition, she left before an attending hospitalist could evaluate her. She also refused to sign AMA paperwork.

## 2021-09-27 NOTE — CHART NOTE - NSCHARTNOTEFT_GEN_A_CORE
Called to evaluate patient for worsening eye swelling. Patient L eye w/ significant edema, unable to open L eye, swelling appears to be extending to R eye as well. As per ED attending and house staff, this is a significant change from few hours prior. Patient upset regarding current management of her condition, wishes to elope, refusing to sign any AMA paperwork. Explained to the patient that leaving the hospital with her current condition is very dangerous, she can develop worsening swelling, vision loss, trauma and death. Patient understood and left the hospital without signing appropriate paperwork. Pt took out her own IV line. Attempted to call patient to bring her back to the hospital but patient is not answering her cellphone. ED attending updated, ED staff updated, hospitalist updated, house staff updated.     Hyeokchan Kwon , PGY3  Pager: [phone] Called to evaluate patient for worsening eye swelling. Patient L eye w/ significant edema, unable to open L eye, swelling appears to be extending to R eye as well. As per ED attending and house staff, this is a significant change from few hours prior. Ophthalmology contacted and agreed to come to the hospital to evaluate the patient. Patient upset regarding current management of her condition, wishes to elope, refusing to sign any AMA paperwork. Explained to the patient that leaving the hospital with her current condition is very dangerous, she can develop worsening swelling, vision loss, trauma and death. Explained to the patient that ophthalmology will be coming in to evaluate her clinical condition. Patient understood and left the hospital without signing appropriate paperwork. Pt took out her own IV line. Attempted to call patient to bring her back to the hospital but patient is not answering her cellphone. ED attending updated, ED staff updated, hospitalist updated, house staff updated.     Hyeokchan Kwon , PGY3  Pager: [phone] Called to evaluate patient for worsening eye swelling. Patient L eye w/ significant edema, unable to open L eye, swelling appears to be extending to R eye as well. As per ED attending and house staff, this is a significant change from few hours prior. Ophthalmology contacted and agreed to come to the hospital to evaluate the patient. Patient upset regarding current management of her condition, wishes to elope, refusing to sign any AMA paperwork. Patient expressed that she will go to another facility to get treatment for her condition given that she is unhappy with her hospital course here. Explained to the patient that leaving the hospital with her current condition is very dangerous, she can develop worsening swelling, vision loss, trauma and death. Explained to the patient that ophthalmology will be coming in to evaluate her clinical condition. Patient understood and left the hospital without signing appropriate paperwork. Pt took out her own IV line. Attempted to call patient to bring her back to the hospital but patient is not answering her cellphone. ED attending updated, ED staff updated, hospitalist updated, house staff updated.     Hyeokchan Kwon , PGY3  Pager: [phone]

## 2021-09-27 NOTE — CHART NOTE - NSCHARTNOTEFT_GEN_A_CORE
spoke to patient over the phone this morning. Patient states she got anxious overnight and could not wait for ophtho to come in so she eloped.   d/w patient concern for lacrimal gland abscess and need for IV antibiotics and Possible surgical drainage, and if not done in timely manner, concern for vision loss, eye loss, even death was discussed. Patient wishes to go to a different hospital system, will go to [facility]. Advised patient to immediately go to a hospital for IV antibiotics.

## 2021-09-27 NOTE — H&P ADULT - NSHPPHYSICALEXAM_GEN_ALL_CORE
Appearance: initially NAD, awake and alert; however, appeared uncomfortable and tired during 2nd encounter  HENT: erythema and edema of L eyelid and infraorbital region, erythematous L sclera, R eye grossly normal in appearance, BL PERRL, no drainage from either eye; on 2nd encounter, edema of L eyelid increased and caused eye to stay shut, trace edema and erythema of R eyelid making patient unable to keep it fully open, R EOM restricted, R PERRL  Neck: supple  Lungs: CTA BL  CV: RRR without murmurs or gallops  Abdomen: soft, non-tender, non-distended, +BS  Skin: erythematous, papular rash of chest, BL forearms, BL LEs, and abdomen  Neuro: moving all extremities equally, non-focal  Psych: awake and alert, affect appropriate

## 2021-09-27 NOTE — H&P ADULT - NSHPREVIEWOFSYSTEMS_GEN_ALL_CORE
Constitutional: denies fevers, chills  HENT: positive for L eye pain, edema, tearing and blurry vision; negative for HA, nasal congestion  Lungs: denies wheezing, SOB  CV: denies CP, palpitations Constitutional: denies fevers, chills  HENT: positive for L eye pain, edema, tearing and blurry vision; negative for HA, nasal congestion  Lungs: denies wheezing, SOB  CV: denies CP, palpitations  GI: denies diarrhea, constipation, nausea, vomiting  : denies dysuria, changes in urinary frequency or urgency  Skin: positive for rashes, itchiness; negative for wounds  Neuro: negative for seizures, weakness  Psych: negative for hallucinations, delusions  MSK: negative for joint pain, muscle pain  Extremities: negative for edema, poor circulation

## 2021-09-27 NOTE — H&P ADULT - NSHPSOCIALHISTORY_GEN_ALL_CORE
Lives with 10-year-old daughter  Former smoker, quit 3 years ago, smoked 1PPD for 16 years  Drinks 1 glass of wine per month  Denies MJ, heroin, or cocaine use

## 2021-09-27 NOTE — ED ADULT NURSE REASSESSMENT NOTE - NS ED NURSE REASSESS COMMENT FT1
patient changed into street clothes. mar at bedside speaking with patient. explained plan of care which is an optho consult. patient appears upset due to the length of stay which is about 15 hours thus far and removed her own iv. provider gave band aid to patient. [id number]. full capacity. steady gait. patient changed into street clothes. mar at bedside speaking with patient. explained plan of care which is an optho consult. patient appears upset due to the length of stay which is about 15 hours thus far and removed her own iv. provider gave band aid to patient. walked out of er. charge nurse made aware.

## 2021-11-11 ENCOUNTER — RESULT REVIEW (OUTPATIENT)
Age: 41
End: 2021-11-11